# Patient Record
Sex: FEMALE | Race: WHITE | Employment: FULL TIME | URBAN - METROPOLITAN AREA
[De-identification: names, ages, dates, MRNs, and addresses within clinical notes are randomized per-mention and may not be internally consistent; named-entity substitution may affect disease eponyms.]

---

## 2017-08-09 DIAGNOSIS — F41.1 GENERALIZED ANXIETY DISORDER: ICD-10-CM

## 2019-12-15 ENCOUNTER — OFFICE VISIT (OUTPATIENT)
Dept: PRIMARY CARE CLINIC | Age: 44
End: 2019-12-15
Payer: COMMERCIAL

## 2019-12-15 VITALS
RESPIRATION RATE: 22 BRPM | WEIGHT: 188.4 LBS | BODY MASS INDEX: 30.28 KG/M2 | TEMPERATURE: 97.6 F | HEART RATE: 120 BPM | HEIGHT: 66 IN

## 2019-12-15 DIAGNOSIS — J01.90 ACUTE BACTERIAL SINUSITIS: Primary | ICD-10-CM

## 2019-12-15 DIAGNOSIS — B96.89 ACUTE BACTERIAL SINUSITIS: Primary | ICD-10-CM

## 2019-12-15 PROCEDURE — G8417 CALC BMI ABV UP PARAM F/U: HCPCS | Performed by: PHYSICIAN ASSISTANT

## 2019-12-15 PROCEDURE — G8427 DOCREV CUR MEDS BY ELIG CLIN: HCPCS | Performed by: PHYSICIAN ASSISTANT

## 2019-12-15 PROCEDURE — 99213 OFFICE O/P EST LOW 20 MIN: CPT | Performed by: PHYSICIAN ASSISTANT

## 2019-12-15 PROCEDURE — 4004F PT TOBACCO SCREEN RCVD TLK: CPT | Performed by: PHYSICIAN ASSISTANT

## 2019-12-15 PROCEDURE — G8484 FLU IMMUNIZE NO ADMIN: HCPCS | Performed by: PHYSICIAN ASSISTANT

## 2019-12-15 RX ORDER — DROSPIRENONE AND ETHINYL ESTRADIOL 0.02-3(28)
1 KIT ORAL DAILY
COMMUNITY

## 2019-12-15 RX ORDER — CEFUROXIME AXETIL 500 MG/1
500 TABLET ORAL 2 TIMES DAILY
Qty: 20 TABLET | Refills: 0 | Status: SHIPPED | OUTPATIENT
Start: 2019-12-15 | End: 2019-12-25

## 2019-12-15 RX ORDER — PREDNISONE 10 MG/1
TABLET ORAL
Qty: 20 TABLET | Refills: 0 | Status: SHIPPED | OUTPATIENT
Start: 2019-12-15

## 2019-12-15 ASSESSMENT — ENCOUNTER SYMPTOMS
VOMITING: 0
SWOLLEN GLANDS: 0
SORE THROAT: 1
WHEEZING: 0
ABDOMINAL PAIN: 0
NAUSEA: 0
DIARRHEA: 0
COUGH: 1
RHINORRHEA: 0
SINUS PAIN: 1

## 2024-05-09 ENCOUNTER — HOSPITAL ENCOUNTER (OUTPATIENT)
Dept: RADIOLOGY | Facility: CLINIC | Age: 49
Discharge: HOME | End: 2024-05-09
Payer: COMMERCIAL

## 2024-05-09 DIAGNOSIS — M25.562 PAIN IN LEFT KNEE: ICD-10-CM

## 2024-05-09 PROCEDURE — 73564 X-RAY EXAM KNEE 4 OR MORE: CPT | Mod: LEFT SIDE | Performed by: RADIOLOGY

## 2024-05-09 PROCEDURE — 73564 X-RAY EXAM KNEE 4 OR MORE: CPT | Mod: LT

## 2024-10-24 NOTE — PROGRESS NOTES
Sinus & Skull Base Surgery    Chief Complaint:  Recurrent sinus infections    History Of Present Illness:  Reason For Visit:     Alecia Alex presents to me for a self-referred new patient visit for evaluation of recurrent sinus infections.    About 3 to 4 years ago she developed issues with headaches and was evaluated through the emergency department.  She was referred to Dr. Hazel through the [facility] following this and she was given medical therapy and they discussed surgical intervention but she did not ultimately pursue this.    She typically gets about 1 sinus infection per year.  In July 2024, she went to [place] and then return to [place].  She developed a sinus infection and was given Augmentin and felt a bit better and then returned to [place] around Labor Day.  Her symptoms recurred and she was producing discolored debris and was evaluated and given doxycycline.  She continues with right sided facial discomfort.  Overall she is feeling better from a sinus perspective but is not back to her baseline.  She has constant, thick postnasal drainage.  She feels that her nose is very irritated and she can have sores within her nose.    She has idiopathic urticaria and has had reactions to ibuprofen in the past if she takes it for more than 24 hours.    Separately, she was given prednisone for a GI issue in April and had a bad reaction to this ultimately resulting in passing out.  She has had longstanding right sided abdominal pain lasting for decades.    Main Symptoms:  Patient has anterior nasal drainage.     Patient has posterior nasal drainage.    Patient has nasal airway obstruction.  Not the last week or so child two.  Patient has facial pain.  Right sided.   Patient has facial pressure.  Right sided.   Patient has decreased sense of smell. Decreased 30-40 % of normal.   Associated Symptoms:   Patient has headaches.    Patient has throat clearing.    Patient has coughing.  Likely  secondary to post nasal drainage  Patient has dysphonia.   Patient has nasal bleeding.  Minimal.    Medications currently on for sinonasal symptoms:  Flonase 2 puffs each side Qday  Xylitol 3 puffs each side QHS  Saline rinses twice per week  Xyzal Qday    Medications tried in the past for sinonasal symptoms:  Allegra  Doxycycline  Augmentin  Sudafed  Mucinex     Other Pertinent Medical Conditions:   Patient has asthma.  ? Exercise induced (not formally worked up)  Patient does not have aspirin sensitivity.  Told to avoid NSAIDS because of idiopathic urticaria.  If she uses it consistently she will get hives.    Patient does not have migraines.    Patient has history of allergy testing. When:  Tested while she was on Allegra.    Patient does not have history of sinus surgery.    Patient does not have history of nasal fracture.    Patient has heartburn.    The patient does take medical therapy for heartburn.  Omeprazole 40mg Qday  The patient has a GI evaluation.    The patient has imaging of sinuses. When: [facility].    Active Problems:  There is no problem list on file for this patient.    Past Medical History:  She has a past medical history of Diaphragmatic hernia without obstruction or gangrene.    Surgical History:  She has a past surgical history that includes Esophagogastroduodenoscopy (04/06/2016); Colonoscopy (04/06/2016); Cholecystectomy (04/06/2016); and Tonsillectomy (04/06/2016).     Family History:  No family history on file.    Social History:  She reports that she has never smoked. She uses smokeless tobacco. She reports that she does not currently use alcohol. She reports that she does not use drugs.     Allergies:  Codeine, Nsaids (non-steroidal anti-inflammatory drug), Meloxicam, and Adhesive    Current Meds:  Current Outpatient Medications:     cholecalciferol (Vitamin D-3) 50 mcg (2,000 unit) capsule, Take by mouth., Disp: , Rfl:     cyclobenzaprine (Flexeril) 10 mg tablet, , Disp: , Rfl:     L.  "acidophilus/Bifid. animalis 32 billion cell capsule, Take by mouth., Disp: , Rfl:     magnesium oxide 400 mg magnesium capsule, Take by mouth., Disp: , Rfl:     omeprazole (PriLOSEC) 40 mg DR capsule, , Disp: , Rfl:     spironolactone (Aldactone) 100 mg tablet, , Disp: , Rfl:     topiramate (Topamax) 50 mg tablet, , Disp: , Rfl:     venlafaxine XR (Effexor-XR) 37.5 mg 24 hr capsule, , Disp: , Rfl:     Vitals:  Visit Vitals  Ht 1.651 m (5' 5\")   Wt 71.9 kg (158 lb 8 oz)   BMI 26.38 kg/m²   Smoking Status Never   BSA 1.82 m²     Physical Exam:  CONSTITUTIONAL:  Vitals reviewed in nursing chart, well developed, well nourished.    RESPIRATION:  Breathing comfortably, no stridor.  CV:  No clubbing/cyanosis/edema in hands.  EYES:  EOM Intact, sclera normal.  NEURO:  Alert and oriented times 3, Cranial nerves 2-12 intact and symmetric bilaterally.  HEAD AND FACE:  Skin with no masses or lesions, sinuses nontender to palpation.  SALIVARY GLANDS:  Parotid and submandibular glands normal bilaterally.  EARS:  Normal external ears, external auditory canals, and TMs to otoscopy, normal hearing to whispered voice.  NOSE:  External nose midline, anterior rhinoscopy is normal with limited visualization to the anterior aspect of the interior turbinates (see nasal endoscopy).  ORAL CAVITY/OROPHARYNX/LIPS:  Normal mucous membranes, normal floor of mouth/tongue/OP, no masses or lesions are noted.  NECK/LYMPH:  No LAD, no thyroid masses.    SINONASAL ENDOSCOPY (CPT 05821): To better evaluate the patient's symptoms, sinonasal endoscopy is indicated.  After discussion of risks and benefits, and topical decongestion and anesthesia, an endoscope was used to perform nasal endoscopy on each side.  A time out identifying the patient, the procedure, the location of the procedure and any concerns was performed prior to beginning the procedure.    Findings:  Examination of the right nasal cavity revealed no evidence of purulence or polyposis at " "the middle meatus or sphenoethmoid recess.  Examination of the left nasal cavity revealed no evidence of purulence or polyposis at the middle meatus or sphenoethmoid recess.  She has a septal deviation to the right.    Results/Data:  I reviewed notes from Dr. Tuan Navarro from 2/8/2019 and April 10, 2019.  They discussed her imaging at that time and medication was provided.  I reviewed the emergency department note from February 3, 2019 during which imaging was obtained.  The sinus findings are listed below:    2/3/19 CT sinus   CT SINUS WO IVCON  Final Result  IMPRESSION:    \"Inflammatory changes of the right maxillary sinus with internal debris, which may suggest acute sinusitis in the appropriate clinical context.\"    Provider Impressions:  1.  Rhinorrhea  2.  Nasal airway obstruction; septal deviation  3.  Facial pain and pressure; headaches  4.  Decreased sense of smell  5.  Throat clearing, coughing, dysphonia  6.  Idiopathic urticaria    Discussion:  Alecia Alex and I discussed her exam and symptoms.  She has had a progression of baseline symptoms over the last several months.  She has been treated with multiple courses of oral antibiotic therapy with persistence of symptoms.  She had a bad reaction to prednisone earlier this year and I would not recommend further treatment with that therapy currently.    We discussed options including imaging or trials of additional intranasal medical therapy.  We reviewed her previous workup from 2019 and there were findings on that CT sinus (report reviewed).  She was comfortable moving forward with repeat imaging and this was ordered today.  I will not provide further oral medical therapy at this point in time given I did not see evidence of infection on examination today.  After completion of the CT sinus, I asked her to follow-up virtually to review those findings.    If she wishes to pursue additional intranasal options after review of her CT I think Xhance " would make a lot of sense.  Because she rinses, she could also consider mometasone.    All questions were answered.    Patient Discussion/Summary:  Welcome to Dr. Bismark Strong's clinic.  He is an ENT that specializes in nose, sinus, and skull base disorders.    Dr. Bismark Strong's office number is [phone].  Please call this number to contact his care team regardless of which office you use to access care.  This number is the most direct way to communicate with all the members of the care team.    His care team includes:    :  Shahida Vaughn  Available to receive calls Monday through Friday from 8:00 am until 4:25 pm  She can help you with scheduling of appointments and any general, non-medical questions about the practice    Primary nurse:  Amie Styles RN, BSN  Available to receive calls Tuesday through Friday from 8:00 am until 4:25 pm  Amie is also Dr. Strong's surgery scheduler and will assist you with planning and scheduling of your surgery during her office hours    Wendy Fonseca RN, BSN   Available to receive calls Monday through Thursday from 8:00 am until 4:25 pm    Wendy Ordonez CNP (sees patients in [place], [facility], and [facility])  Destinee Clark CNP (sees patients in [facility] and [facility])  Nurse practitioners who are part of Dr. Strong's team.  They work collaboratively with Dr. Strong.  If a more urgent appointment is needed they are a wonderful resource.    Aiden West MD (sees patients in [place] and [facility])  Shelbi Wilcox MD (sees patients in [facility], [facility], and [place])  Dr. Strong's partners in the division of Rhinology    For your convenience, Dr. Strong sees patients at [facility] and [facility].  While we try to make your appointments as convenient as possible, occasionally a visit to another location may be necessary to provide the best care for you.    We look forward to working with you to meet your healthcare  goals.    Scribe Attestation  By signing my name below, I, Fredy Chadwick, attest that this documentation has been prepared under the direction and in the presence of Bismark Strong MD.    Signature:  Bismark Strong MD

## 2024-10-30 ENCOUNTER — APPOINTMENT (OUTPATIENT)
Dept: OTOLARYNGOLOGY | Facility: CLINIC | Age: 49
End: 2024-10-30
Payer: COMMERCIAL

## 2024-10-30 VITALS — WEIGHT: 158.5 LBS | HEIGHT: 65 IN | BODY MASS INDEX: 26.41 KG/M2

## 2024-10-30 DIAGNOSIS — J34.2 DEVIATED NASAL SEPTUM: ICD-10-CM

## 2024-10-30 DIAGNOSIS — R44.8 FACIAL PRESSURE: ICD-10-CM

## 2024-10-30 DIAGNOSIS — J34.89 NASAL CONGESTION WITH RHINORRHEA: ICD-10-CM

## 2024-10-30 DIAGNOSIS — R09.81 NASAL CONGESTION WITH RHINORRHEA: ICD-10-CM

## 2024-10-30 DIAGNOSIS — J32.1 CHRONIC FRONTAL SINUSITIS: Primary | ICD-10-CM

## 2024-10-30 PROCEDURE — 3008F BODY MASS INDEX DOCD: CPT | Performed by: OTOLARYNGOLOGY

## 2024-10-30 PROCEDURE — 99204 OFFICE O/P NEW MOD 45 MIN: CPT | Performed by: OTOLARYNGOLOGY

## 2024-10-30 PROCEDURE — 31231 NASAL ENDOSCOPY DX: CPT | Performed by: OTOLARYNGOLOGY

## 2024-10-30 RX ORDER — CYCLOBENZAPRINE HCL 10 MG
TABLET ORAL
COMMUNITY
Start: 2024-08-02

## 2024-10-30 RX ORDER — ACETAMINOPHEN 500 MG
TABLET ORAL
COMMUNITY

## 2024-10-30 RX ORDER — TOPIRAMATE 50 MG/1
TABLET, FILM COATED ORAL
COMMUNITY
Start: 2023-09-16

## 2024-10-30 RX ORDER — VENLAFAXINE HYDROCHLORIDE 37.5 MG/1
CAPSULE, EXTENDED RELEASE ORAL
COMMUNITY
Start: 2024-05-12

## 2024-10-30 RX ORDER — OMEPRAZOLE 40 MG/1
CAPSULE, DELAYED RELEASE ORAL
COMMUNITY
Start: 2023-04-21

## 2024-10-30 RX ORDER — CALCIUM CARBONATE/VITAMIN D3 500-10/5ML
LIQUID (ML) ORAL
COMMUNITY

## 2024-10-30 RX ORDER — SPIRONOLACTONE 100 MG/1
TABLET, FILM COATED ORAL
COMMUNITY
Start: 2024-10-14

## 2024-10-30 ASSESSMENT — PAIN SCALES - GENERAL: PAINLEVEL_OUTOF10: 9

## 2024-10-30 ASSESSMENT — PATIENT HEALTH QUESTIONNAIRE - PHQ9
2. FEELING DOWN, DEPRESSED OR HOPELESS: NOT AT ALL
1. LITTLE INTEREST OR PLEASURE IN DOING THINGS: NOT AT ALL
SUM OF ALL RESPONSES TO PHQ9 QUESTIONS 1 AND 2: 0

## 2024-11-13 ENCOUNTER — HOSPITAL ENCOUNTER (OUTPATIENT)
Dept: RADIOLOGY | Facility: CLINIC | Age: 49
Discharge: HOME | End: 2024-11-13
Payer: COMMERCIAL

## 2024-11-13 DIAGNOSIS — J32.1 CHRONIC FRONTAL SINUSITIS: ICD-10-CM

## 2024-11-13 PROCEDURE — 70486 CT MAXILLOFACIAL W/O DYE: CPT

## 2024-12-10 ENCOUNTER — APPOINTMENT (OUTPATIENT)
Dept: OTOLARYNGOLOGY | Facility: CLINIC | Age: 49
End: 2024-12-10
Payer: COMMERCIAL

## 2025-01-08 ENCOUNTER — APPOINTMENT (OUTPATIENT)
Facility: CLINIC | Age: 50
End: 2025-01-08
Payer: COMMERCIAL

## 2025-01-08 DIAGNOSIS — R09.81 NASAL CONGESTION WITH RHINORRHEA: ICD-10-CM

## 2025-01-08 DIAGNOSIS — J34.89 NASAL CONGESTION WITH RHINORRHEA: ICD-10-CM

## 2025-01-08 DIAGNOSIS — J32.1 CHRONIC FRONTAL SINUSITIS: ICD-10-CM

## 2025-01-08 DIAGNOSIS — J32.0 CHRONIC MAXILLARY SINUSITIS: Primary | ICD-10-CM

## 2025-01-08 DIAGNOSIS — R43.8 DECREASED SENSE OF SMELL: ICD-10-CM

## 2025-01-08 PROCEDURE — 99214 OFFICE O/P EST MOD 30 MIN: CPT | Performed by: OTOLARYNGOLOGY

## 2025-01-08 RX ORDER — MOMETASONE FUROATE 100 %
POWDER (GRAM) MISCELLANEOUS
Qty: 180 G | Refills: 3 | Status: SHIPPED | OUTPATIENT
Start: 2025-01-08

## 2025-01-08 NOTE — PROGRESS NOTES
Sinus & Skull Base Surgery    Virtual or Telephone Consent:  An interactive audio and video telecommunication system which permits real time communications between the patient (at the originating site) and provider (at the distant site) was utilized to provide this telehealth service.   Verbal consent was requested and obtained from Alecia Alex on this date, 01/08/2025 for a telehealth visit.     Chief Complaint:  1.  Rhinorrhea  2.  Nasal airway obstruction; septal deviation  3.  Facial pain and pressure; headaches  4.  Decreased sense of smell  5.  Throat clearing, coughing, dysphonia  6.  Idiopathic urticaria    History Of Present Illness:    Alecia Alex presents since last being seen 10/30/2024.     Following that evaluation, I ordered a CT sinus and this was obtained.  She presents today to review this.    We also discussed previous medical trials and she mentioned that she has used Azelastine in the past in addition to Allegra, doxycycline, Augmentin, Sudafed, and Mucinex.  During her last evaluation she was actively using Flonase, xylitol, saline rinses, and Xyzal.    Active Problems:  There is no problem list on file for this patient.    Past Medical History:  She has a past medical history of Diaphragmatic hernia without obstruction or gangrene.    Surgical History:  She has a past surgical history that includes Esophagogastroduodenoscopy (04/06/2016); Colonoscopy (04/06/2016); Cholecystectomy (04/06/2016); and Tonsillectomy (04/06/2016).    Family History:  No family history on file.    Social History:  She reports that she has never smoked. She uses smokeless tobacco. She reports that she does not currently use alcohol. She reports that she does not use drugs.    Allergies:  Codeine, Nsaids (non-steroidal anti-inflammatory drug), Meloxicam, and Adhesive    Current Meds:  Current Outpatient Medications:     cholecalciferol (Vitamin D-3) 50 mcg (2,000 unit) capsule, Take by mouth., Disp:  , Rfl:     cyclobenzaprine (Flexeril) 10 mg tablet, , Disp: , Rfl:     L. acidophilus/Bifid. animalis 32 billion cell capsule, Take by mouth., Disp: , Rfl:     magnesium oxide 400 mg magnesium capsule, Take by mouth., Disp: , Rfl:     omeprazole (PriLOSEC) 40 mg DR capsule, , Disp: , Rfl:     spironolactone (Aldactone) 100 mg tablet, , Disp: , Rfl:     topiramate (Topamax) 50 mg tablet, , Disp: , Rfl:     venlafaxine XR (Effexor-XR) 37.5 mg 24 hr capsule, , Disp: , Rfl:     Vitals:  Visit Vitals  Smoking Status Never     Physical Exam:  Virtual visit.    Results/Data:  I reviewed the CT sinus dated November 13, 2024 with the patient today in clinic.  There were inflammatory changes at the base of each frontal.  There was a mucous retention cyst or polyp in the left maxillary sinus.  There were inflammatory changes within the right maxillary sinus at the periphery.  She had some inflammatory changes within each ethmoid cavity more significant on the right.  There were some inflammatory changes in the left sphenoid.     Official report:  IMPRESSION:  Mild scattered paranasal sinus disease as described above with 2.0 cm left maxillary mucous retention cyst. Midline nasal septum. No significant hyperostosis.    Provider Impressions:  1.  Chronic sinusitis  2.  Rhinorrhea  3.  Nasal airway obstruction; septal deviation  4.  Facial pain and pressure; headaches  5.  Decreased sense of smell  6.  Throat clearing, coughing, dysphonia  7.  Idiopathic urticaria    Discussion:  Alecia Abi and I discussed her CT and options.  She could consider alternative intranasal medical therapy specifically something like mometasone.  She is already rinsing intermittently and this would require more consistent rinsing but may provide more benefit than what she is used previously.  She could also consider something like Xhance.    She mentioned that she has been previously tested for allergies but she was on Allegra at that time.  She  could consider in vitro allergy testing.    We also discussed what would be involved in endonasal surgery at a high level opening up the disease sinuses and also her candidacy for nasal airway surgery.  This would be a procedure performed under a general anesthetic.  I think she would get benefit based on her CT but there is certainly a chance for persistence of symptoms in individuals who have an allergic or headache background as some of the symptoms may be driven by a nonsinus cause.  If she had a sinus surgery, this will be performed on a same-day basis.    At the conclusion of today's assessment, she was comfortable utilizing mometasone within rinses.  A 90-day supply with 2 mg dosing was prescribed and I recommended using this once or twice per day consistently.  I suggested discontinuation of fluticasone when she initiates this therapy if she is using it twice per day.  She could rinse once per day and use fluticasone 2 sprays each nostril once per day for the other steroid dosing if she does not water rinse twice per day.    I asked her to follow-up with me in about 10 weeks.  All questions were answered.    Scribe Attestation  By signing my name below, I, Fredy Chadwick, attest that this documentation has been prepared under the direction and in the presence of Bismark Strong MD.    Signature:  Bismark Strong MD

## 2026-01-02 ENCOUNTER — APPOINTMENT (OUTPATIENT)
Dept: UROLOGY | Facility: CLINIC | Age: 51
End: 2026-01-02
Payer: COMMERCIAL